# Patient Record
(demographics unavailable — no encounter records)

---

## 2024-10-10 NOTE — HISTORY OF PRESENT ILLNESS
[FreeTextEntry1] : New patient CV evaluation.  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

## 2024-10-10 NOTE — HISTORY OF PRESENT ILLNESS
[FreeTextEntry1] : New patient CV evaluation.  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

## 2024-10-10 NOTE — PHYSICAL EXAM
[Well Developed] : well developed [Well Nourished] : well nourished [No Acute Distress] : no acute distress [Normal Conjunctiva] : normal conjunctiva [Normal Venous Pressure] : normal venous pressure [No Carotid Bruit] : no carotid bruit [Normal S1, S2] : normal S1, S2 [No Murmur] : no murmur [No Rub] : no rub [No Gallop] : no gallop [Clear Lung Fields] : clear lung fields [Good Air Entry] : good air entry [No Respiratory Distress] : no respiratory distress  [Soft] : abdomen soft [Non Tender] : non-tender [No Masses/organomegaly] : no masses/organomegaly [Normal Bowel Sounds] : normal bowel sounds [Normal Gait] : normal gait [No Edema] : no edema [No Cyanosis] : no cyanosis [No Clubbing] : no clubbing [No Varicosities] : no varicosities [No Rash] : no rash [No Skin Lesions] : no skin lesions [Moves all extremities] : moves all extremities [No Focal Deficits] : no focal deficits [Normal Speech] : normal speech [Alert and Oriented] : alert and oriented [Normal memory] : normal memory [de-identified] : Repeat /100 mmHg left; 140/90 mmHg right

## 2024-10-10 NOTE — PHYSICAL EXAM
[Well Developed] : well developed [Well Nourished] : well nourished [No Acute Distress] : no acute distress [Normal Conjunctiva] : normal conjunctiva [Normal Venous Pressure] : normal venous pressure [No Carotid Bruit] : no carotid bruit [Normal S1, S2] : normal S1, S2 [No Murmur] : no murmur [No Rub] : no rub [No Gallop] : no gallop [Clear Lung Fields] : clear lung fields [Good Air Entry] : good air entry [No Respiratory Distress] : no respiratory distress  [Soft] : abdomen soft [Non Tender] : non-tender [No Masses/organomegaly] : no masses/organomegaly [Normal Bowel Sounds] : normal bowel sounds [Normal Gait] : normal gait [No Edema] : no edema [No Cyanosis] : no cyanosis [No Clubbing] : no clubbing [No Varicosities] : no varicosities [No Rash] : no rash [No Skin Lesions] : no skin lesions [Moves all extremities] : moves all extremities [No Focal Deficits] : no focal deficits [Normal Speech] : normal speech [Alert and Oriented] : alert and oriented [Normal memory] : normal memory [de-identified] : Repeat /100 mmHg left; 140/90 mmHg right

## 2024-12-12 NOTE — HISTORY OF PRESENT ILLNESS
[FreeTextEntry1] : Follow-up patient CV evaluation.  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 In clinic, BP up to 150/100 mmHg.     After a long discussion, we collectively decided to treat his elevated BP. Will start with     losartan 25 mg daily with close home monitoring. Decrease salt and caffeine intake.     RTC 6 weeks for labs and f/u. At risk for cardiovascular event (V15.89) (Z91.89) No CV symptoms. CV risk factor of HTN. At next visit, will recheck A1c, lipids, Lp(a), CRP.     No indication for CV testing at this time. At some point, may consider CAC.  Since last visit, tolerating medication. Exercises a few days per week; treadmill. No CV symptoms. BP's much improved, now in 120/80's mmHg; never lower than 120; not higher than 130 mmHg.   EKG today:  Sinus Rhythm  -Nonspecific QRS widening. Nonspecific ST abnormalities BORDERLINE

## 2025-06-26 NOTE — HISTORY OF PRESENT ILLNESS
[FreeTextEntry1] : Follow-up patient CV evaluation and CV risk.  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 CV risk factor of HTN. Today, will recheck A1c, lipids, Lp(a), CRP. No     indication for CV testing at this time. At some point, may consider CAC. Benign essential hypertension (401.1) (I10) Long h/o variable BP's, now much improved with losartan 25 mg daily. Continue with     close home monitoring. Decrease salt and caffeine intake.  Labs12/2024: A1c 5.6% CMP normal LDL 86, trig 132, HDL 74, tchol 183 mg/dL; Lp(a) <9 CRP 1.41  Since last visit, doing well. /90's mmHg at home. Compliant with medications.  Weight remains high. Diet is reasonable. Active, exercising; no CV symptoms.  EKG today:  Sinus Rhythm  NORMAL ECG

## 2025-06-26 NOTE — HISTORY OF PRESENT ILLNESS
[FreeTextEntry1] : Follow-up patient CV evaluation and CV risk.  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 CV risk factor of HTN. Today, will recheck A1c, lipids, Lp(a), CRP. No     indication for CV testing at this time. At some point, may consider CAC. Benign essential hypertension (401.1) (I10) Long h/o variable BP's, now much improved with losartan 25 mg daily. Continue with     close home monitoring. Decrease salt and caffeine intake.  Labs12/2024: A1c 5.6% CMP normal LDL 86, trig 132, HDL 74, tchol 183 mg/dL; Lp(a) <9 CRP 1.41  Since last visit, doing well. /90's mmHg at home. Compliant with medications.  Weight remains high. Diet is reasonable. Active, exercising; no CV symptoms.  EKG today:  Sinus Rhythm  NORMAL ECG